# Patient Record
Sex: MALE
[De-identification: names, ages, dates, MRNs, and addresses within clinical notes are randomized per-mention and may not be internally consistent; named-entity substitution may affect disease eponyms.]

---

## 2019-07-11 ENCOUNTER — HOSPITAL ENCOUNTER (EMERGENCY)
Dept: HOSPITAL 5 - ED | Age: 24
Discharge: HOME | End: 2019-07-11
Payer: COMMERCIAL

## 2019-07-11 VITALS — DIASTOLIC BLOOD PRESSURE: 90 MMHG | SYSTOLIC BLOOD PRESSURE: 138 MMHG

## 2019-07-11 DIAGNOSIS — Z88.5: ICD-10-CM

## 2019-07-11 DIAGNOSIS — S00.01XA: Primary | ICD-10-CM
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
## 2019-07-11 LAB
BENZODIAZEPINES SCREEN,URINE: (no result)
METHADONE SCREEN,URINE: (no result)
OPIATE SCREEN,URINE: (no result)

## 2019-07-11 PROCEDURE — 80307 DRUG TEST PRSMV CHEM ANLYZR: CPT

## 2019-07-11 PROCEDURE — 70450 CT HEAD/BRAIN W/O DYE: CPT

## 2019-07-11 PROCEDURE — 99284 EMERGENCY DEPT VISIT MOD MDM: CPT

## 2019-07-11 NOTE — CAT SCAN REPORT
CT BRAIN: 7/11/2019



INDICATION / CLINICAL INFORMATION:

head trauma.



COMPARISON: 

None available.



FINDINGS:



BRAIN/INTRACRANIAL STRUCTURES: Unenhanced CT images of the brain demonstrate no evidence of acute int
racranial abnormality.



Ventricles and sulci are normal in size and shape.



There is no evidence of hemorrhage or mass. There are no abnormal extra-axial fluid collections.









EXTRACRANIAL STRUCTURES: Unremarkable.







IMPRESSION:

 Negative unenhanced CT of the brain.









All CT scans at this location are performed using dose reduction to ALARA by means of automated expos
ure control.



Signer Name: Teo Johnson MD 

Signed: 7/11/2019 5:58 PM

 Workstation Name: [id number]

## 2019-07-11 NOTE — EMERGENCY DEPARTMENT REPORT
HPI





- General


Time Seen by Provider: 07/11/19 15:56





- HPI


HPI: 





23 year old  male presents to the emergency department from work after 

a piece of metal fell down and hit him in the head while he was trying to change

a tire.  He denies any loss of consciousness but says "I stumbled."  He has a 

small abrasion to the front of his scalp and top of his forehead, which is where

he has pain.  He denies any vision change, slurred speech or any neurological 

deficits.  He has not taken anything for his symptoms prior to arrival today.  

He denies any past medical history.  





ED Past Medical Hx





- Past Medical History


Previous Medical History?: No


Hx Hypertension: No


Hx CVA: No


Hx Heart Attack/AMI: No


Hx Congestive Heart Failure: No


Hx Diabetes: No


Hx Deep Vein Thrombosis: No


Hx Pulmonary Embolism: No


Hx GERD: No


Hx Liver Disease: No


Hx Renal Disease: No


Hx of Cancer: No


Hx Sickle Cell Disease: No


Hx Arthritis: No


Hx Headaches / Migraines: No


Hx Seizures: No


Hx Kidney Stones: No


Hx Psychiatric Treatment: No


Hx Asthma: No


Hx COPD: No


Hx Tuberculosis: No


Hx Dementia: No


Hx HIV: No





- Surgical History


Past Surgical History?: Yes


Hx Coronary Stent: No


Hx Open Heart Surgery: No


Hx Pacemaker: No


Hx Internal Defibrillator: No


Hx Cholecystectomy: No


Hx Appendectomy: No


Hx Breast Surgery: No


Additional Surgical History: shoulder surgery





- Social History


Smoking Status: Never Smoker


Substance Use Type: None





ED Review of Systems


ROS: 


Stated complaint: HEAD PAIN


Other details as noted in HPI





Comment: All other systems reviewed and negative


Constitutional: denies: chills, fever


Eyes: denies: eye pain, vision change


ENT: denies: ear pain, throat pain


Respiratory: denies: cough, shortness of breath


Cardiovascular: denies: chest pain, palpitations


Gastrointestinal: denies: abdominal pain, vomiting


Genitourinary: denies: dysuria, discharge


Musculoskeletal: denies: back pain, arthralgia


Skin: denies: rash, pruritus


Neurological: headache.  denies: weakness





Physical Exam





- Physical Exam


Vital Signs: 


                                   Vital Signs











  07/11/19





  15:56


 


Temperature 98.6 F


 


Pulse Rate 90


 


Respiratory 18





Rate 


 


Blood Pressure 138/90


 


Blood Pressure 138/90





[Right] 


 


O2 Sat by Pulse 100





Oximetry 











Physical Exam: 





GENERAL: The patient is well-developed well-nourished.


HENT: Normocephalic.  Atraumatic.    Patient has moist mucous membranes.


EYES: Extraocular motions are intact.  Pupils equal reactive to light 

bilaterally.  No nystagmus.  Cranial nerves II through XII grossly intact.


NECK: Supple.  Trachea is midline.


CHEST/LUNGS: Clear to auscultation.  There is no respiratory distress noted.


HEART/CARDIOVASCULAR: Regular.  There is no tachycardia.  There is no murmur.


ABDOMEN: There is no abdominal distention.


SKIN: Skin is warm and dry.  There is a small abrasion to the middle top of the 

forehead and the anterior scalp.  No current bleeding, weeping, drainage.


NEURO: The patient is awake, alert, and oriented.  The patient is cooperative.  

The patient has no focal neurologic deficits.  The patient has normal speech.


MUSCULOSKELETAL: There is no tenderness or deformity.  There is no limitation 

range of motion.  There is no evidence of acute injury.





ED Course


                                   Vital Signs











  07/11/19





  15:56


 


Temperature 98.6 F


 


Pulse Rate 90


 


Respiratory 18





Rate 


 


Blood Pressure 138/90


 


Blood Pressure 138/90





[Right] 


 


O2 Sat by Pulse 100





Oximetry 














ED Medical Decision Making





- Radiology Data


Radiology results: report reviewed





CT BRAIN: 7/11/2019 





INDICATION / CLINICAL INFORMATION: 


head trauma. 





COMPARISON: 


None available. 





FINDINGS: 





BRAIN/INTRACRANIAL STRUCTURES: Unenhanced CT images of the brain demonstrate no 

evidence of acute 


intracranial abnormality. 





Ventricles and sulci are normal in size and shape. 





There is no evidence of hemorrhage or mass. There are no abnormal extra-axial 

fluid collections. 














EXTRACRANIAL STRUCTURES: Unremarkable. 











IMPRESSION: 


Negative unenhanced CT of the brain. 











- Medical Decision Making





This patient presents to the emergency department after he was hit on the head 

by some type of metal contraption that he uses to change tires at work.  There 

was no loss of consciousness.  On examination there is no focal, motor or 

sensory deficits and his cranial nerves are intact.  A CT scan of the head was 

done that does not show any skull fracture, brain bleed, ischemia, swelling, 

shift, or any other acute process.  Patient was seen and released to the 

emergency Department upon discharge and appears stable.  He will follow-up with 

his Worker's Compensation physician or his primary care physician.  We discussed

 wound care and monitoring for infection of the abrasion on his forehead and 

scalp.  He will return to the ER with any worsening of his symptoms or any acute

 distress.





- Differential Diagnosis


skull fracture, subarachnoid, abrasion, laceration, concussion


Critical Care Time: No


Critical care attestation.: 


If time is entered above; I have spent that time in minutes in the direct care 

of this critically ill patient, excluding procedure time.








ED Disposition


Clinical Impression: 


Head trauma


Qualifiers:


 Encounter type: initial encounter Qualified Code(s): S09.90XA - Unspecified 

injury of head, initial encounter





Scalp abrasion


Qualifiers:


 Encounter type: initial encounter Qualified Code(s): S00.01XA - Abrasion of 

scalp, initial encounter





Disposition: DC-01 TO HOME OR SELFCARE


Is pt being admited?: No


Condition: Stable


Instructions:  Minor Head Injury (ED), Abrasion (ED)


Additional Instructions: 


Please follow up with her primary care physician or whomever you are directed to

 see for Worker's Compensation.  He can clean the abrasion on the forehead/scalp

 with soap and water but then keep it dry.  Make sure you are seen immediately 

with any signs or symptoms of infection such as surrounding redness, increased 

pain, swelling, development of fever, or discharge of pus.  Return to the 

emergency Department with any worsening of her symptoms or any acute distress.


Referrals: 


Primary Care Provider, Your [Other] - 2-3 Days


Time of Disposition: 18:18